# Patient Record
Sex: MALE | Race: WHITE | NOT HISPANIC OR LATINO
[De-identification: names, ages, dates, MRNs, and addresses within clinical notes are randomized per-mention and may not be internally consistent; named-entity substitution may affect disease eponyms.]

---

## 2017-04-24 PROBLEM — Z00.00 ENCOUNTER FOR PREVENTIVE HEALTH EXAMINATION: Status: ACTIVE | Noted: 2017-04-24

## 2017-09-04 ENCOUNTER — TRANSCRIPTION ENCOUNTER (OUTPATIENT)
Age: 57
End: 2017-09-04

## 2023-09-13 ENCOUNTER — NON-APPOINTMENT (OUTPATIENT)
Age: 63
End: 2023-09-13

## 2023-09-13 ENCOUNTER — EMERGENCY (EMERGENCY)
Facility: HOSPITAL | Age: 63
LOS: 1 days | Discharge: HOME | End: 2023-09-13
Attending: STUDENT IN AN ORGANIZED HEALTH CARE EDUCATION/TRAINING PROGRAM
Payer: COMMERCIAL

## 2023-09-13 VITALS
TEMPERATURE: 98 F | DIASTOLIC BLOOD PRESSURE: 92 MMHG | SYSTOLIC BLOOD PRESSURE: 144 MMHG | OXYGEN SATURATION: 96 % | WEIGHT: 175.05 LBS | RESPIRATION RATE: 18 BRPM | HEART RATE: 68 BPM | HEIGHT: 70 IN

## 2023-09-13 VITALS
OXYGEN SATURATION: 98 % | HEART RATE: 54 BPM | DIASTOLIC BLOOD PRESSURE: 85 MMHG | SYSTOLIC BLOOD PRESSURE: 134 MMHG | TEMPERATURE: 98 F | RESPIRATION RATE: 18 BRPM

## 2023-09-13 DIAGNOSIS — H49.22 SIXTH [ABDUCENT] NERVE PALSY, LEFT EYE: ICD-10-CM

## 2023-09-13 DIAGNOSIS — I77.6 ARTERITIS, UNSPECIFIED: ICD-10-CM

## 2023-09-13 PROBLEM — Z00.00 ENCOUNTER FOR PREVENTIVE HEALTH EXAMINATION: Status: ACTIVE | Noted: 2023-09-13

## 2023-09-13 LAB
ALBUMIN SERPL ELPH-MCNC: 3.5 G/DL — SIGNIFICANT CHANGE UP (ref 3.5–5)
ALP SERPL-CCNC: 67 U/L — SIGNIFICANT CHANGE UP (ref 40–120)
ALT FLD-CCNC: 27 U/L DA — SIGNIFICANT CHANGE UP (ref 10–60)
ANION GAP SERPL CALC-SCNC: 0 MMOL/L — LOW (ref 5–17)
APPEARANCE UR: CLEAR — SIGNIFICANT CHANGE UP
AST SERPL-CCNC: 15 U/L — SIGNIFICANT CHANGE UP (ref 10–40)
BASOPHILS # BLD AUTO: 0.05 K/UL — SIGNIFICANT CHANGE UP (ref 0–0.2)
BASOPHILS NFR BLD AUTO: 0.8 % — SIGNIFICANT CHANGE UP (ref 0–2)
BILIRUB SERPL-MCNC: 0.6 MG/DL — SIGNIFICANT CHANGE UP (ref 0.2–1.2)
BILIRUB UR-MCNC: NEGATIVE — SIGNIFICANT CHANGE UP
BUN SERPL-MCNC: 16 MG/DL — SIGNIFICANT CHANGE UP (ref 7–18)
CALCIUM SERPL-MCNC: 8.5 MG/DL — SIGNIFICANT CHANGE UP (ref 8.4–10.5)
CHLORIDE SERPL-SCNC: 109 MMOL/L — HIGH (ref 96–108)
CO2 SERPL-SCNC: 28 MMOL/L — SIGNIFICANT CHANGE UP (ref 22–31)
COLOR SPEC: YELLOW — SIGNIFICANT CHANGE UP
CREAT SERPL-MCNC: 0.93 MG/DL — SIGNIFICANT CHANGE UP (ref 0.5–1.3)
DIFF PNL FLD: NEGATIVE — SIGNIFICANT CHANGE UP
EGFR: 92 ML/MIN/1.73M2 — SIGNIFICANT CHANGE UP
EOSINOPHIL # BLD AUTO: 0.12 K/UL — SIGNIFICANT CHANGE UP (ref 0–0.5)
EOSINOPHIL NFR BLD AUTO: 2 % — SIGNIFICANT CHANGE UP (ref 0–6)
GLUCOSE SERPL-MCNC: 97 MG/DL — SIGNIFICANT CHANGE UP (ref 70–99)
GLUCOSE UR QL: NEGATIVE MG/DL — SIGNIFICANT CHANGE UP
HCT VFR BLD CALC: 42.6 % — SIGNIFICANT CHANGE UP (ref 39–50)
HGB BLD-MCNC: 14.3 G/DL — SIGNIFICANT CHANGE UP (ref 13–17)
IMM GRANULOCYTES NFR BLD AUTO: 0.3 % — SIGNIFICANT CHANGE UP (ref 0–0.9)
KETONES UR-MCNC: NEGATIVE MG/DL — SIGNIFICANT CHANGE UP
LEUKOCYTE ESTERASE UR-ACNC: NEGATIVE — SIGNIFICANT CHANGE UP
LYMPHOCYTES # BLD AUTO: 0.88 K/UL — LOW (ref 1–3.3)
LYMPHOCYTES # BLD AUTO: 14.6 % — SIGNIFICANT CHANGE UP (ref 13–44)
MCHC RBC-ENTMCNC: 30.9 PG — SIGNIFICANT CHANGE UP (ref 27–34)
MCHC RBC-ENTMCNC: 33.6 GM/DL — SIGNIFICANT CHANGE UP (ref 32–36)
MCV RBC AUTO: 92 FL — SIGNIFICANT CHANGE UP (ref 80–100)
MONOCYTES # BLD AUTO: 0.28 K/UL — SIGNIFICANT CHANGE UP (ref 0–0.9)
MONOCYTES NFR BLD AUTO: 4.7 % — SIGNIFICANT CHANGE UP (ref 2–14)
NEUTROPHILS # BLD AUTO: 4.67 K/UL — SIGNIFICANT CHANGE UP (ref 1.8–7.4)
NEUTROPHILS NFR BLD AUTO: 77.6 % — HIGH (ref 43–77)
NITRITE UR-MCNC: NEGATIVE — SIGNIFICANT CHANGE UP
NRBC # BLD: 0 /100 WBCS — SIGNIFICANT CHANGE UP (ref 0–0)
PH UR: 6.5 — SIGNIFICANT CHANGE UP (ref 5–8)
PLATELET # BLD AUTO: 180 K/UL — SIGNIFICANT CHANGE UP (ref 150–400)
POTASSIUM SERPL-MCNC: 4.3 MMOL/L — SIGNIFICANT CHANGE UP (ref 3.5–5.3)
POTASSIUM SERPL-SCNC: 4.3 MMOL/L — SIGNIFICANT CHANGE UP (ref 3.5–5.3)
PROT SERPL-MCNC: 6.6 G/DL — SIGNIFICANT CHANGE UP (ref 6–8.3)
PROT UR-MCNC: NEGATIVE MG/DL — SIGNIFICANT CHANGE UP
RBC # BLD: 4.63 M/UL — SIGNIFICANT CHANGE UP (ref 4.2–5.8)
RBC # FLD: 12.9 % — SIGNIFICANT CHANGE UP (ref 10.3–14.5)
SODIUM SERPL-SCNC: 137 MMOL/L — SIGNIFICANT CHANGE UP (ref 135–145)
SP GR SPEC: 1.02 — SIGNIFICANT CHANGE UP (ref 1–1.03)
TROPONIN I, HIGH SENSITIVITY RESULT: 4.5 NG/L — SIGNIFICANT CHANGE UP
UROBILINOGEN FLD QL: 1 MG/DL — SIGNIFICANT CHANGE UP (ref 0.2–1)
WBC # BLD: 6.02 K/UL — SIGNIFICANT CHANGE UP (ref 3.8–10.5)
WBC # FLD AUTO: 6.02 K/UL — SIGNIFICANT CHANGE UP (ref 3.8–10.5)

## 2023-09-13 PROCEDURE — 70496 CT ANGIOGRAPHY HEAD: CPT | Mod: 26,MA

## 2023-09-13 PROCEDURE — 36415 COLL VENOUS BLD VENIPUNCTURE: CPT

## 2023-09-13 PROCEDURE — 70496 CT ANGIOGRAPHY HEAD: CPT | Mod: MA

## 2023-09-13 PROCEDURE — 85025 COMPLETE CBC W/AUTO DIFF WBC: CPT

## 2023-09-13 PROCEDURE — 96375 TX/PRO/DX INJ NEW DRUG ADDON: CPT | Mod: XU

## 2023-09-13 PROCEDURE — 99285 EMERGENCY DEPT VISIT HI MDM: CPT

## 2023-09-13 PROCEDURE — 80053 COMPREHEN METABOLIC PANEL: CPT

## 2023-09-13 PROCEDURE — 87086 URINE CULTURE/COLONY COUNT: CPT

## 2023-09-13 PROCEDURE — 84484 ASSAY OF TROPONIN QUANT: CPT

## 2023-09-13 PROCEDURE — 70498 CT ANGIOGRAPHY NECK: CPT | Mod: 26,MA

## 2023-09-13 PROCEDURE — 96367 TX/PROPH/DG ADDL SEQ IV INF: CPT

## 2023-09-13 PROCEDURE — 81003 URINALYSIS AUTO W/O SCOPE: CPT

## 2023-09-13 PROCEDURE — 70498 CT ANGIOGRAPHY NECK: CPT | Mod: MA

## 2023-09-13 PROCEDURE — 99283 EMERGENCY DEPT VISIT LOW MDM: CPT

## 2023-09-13 PROCEDURE — 99284 EMERGENCY DEPT VISIT MOD MDM: CPT | Mod: 25

## 2023-09-13 PROCEDURE — 96365 THER/PROPH/DIAG IV INF INIT: CPT | Mod: XU

## 2023-09-13 RX ORDER — DIPHENHYDRAMINE HCL 50 MG
25 CAPSULE ORAL ONCE
Refills: 0 | Status: COMPLETED | OUTPATIENT
Start: 2023-09-13 | End: 2023-09-13

## 2023-09-13 RX ORDER — ACETAMINOPHEN 500 MG
1000 TABLET ORAL ONCE
Refills: 0 | Status: COMPLETED | OUTPATIENT
Start: 2023-09-13 | End: 2023-09-13

## 2023-09-13 RX ORDER — SODIUM CHLORIDE 9 MG/ML
1000 INJECTION INTRAMUSCULAR; INTRAVENOUS; SUBCUTANEOUS ONCE
Refills: 0 | Status: COMPLETED | OUTPATIENT
Start: 2023-09-13 | End: 2023-09-13

## 2023-09-13 RX ORDER — MAGNESIUM SULFATE 500 MG/ML
1 VIAL (ML) INJECTION ONCE
Refills: 0 | Status: COMPLETED | OUTPATIENT
Start: 2023-09-13 | End: 2023-09-13

## 2023-09-13 RX ORDER — METOCLOPRAMIDE HCL 10 MG
10 TABLET ORAL ONCE
Refills: 0 | Status: COMPLETED | OUTPATIENT
Start: 2023-09-13 | End: 2023-09-13

## 2023-09-13 RX ADMIN — Medication 1000 MILLIGRAM(S): at 07:37

## 2023-09-13 RX ADMIN — Medication 10 MILLIGRAM(S): at 06:37

## 2023-09-13 RX ADMIN — SODIUM CHLORIDE 1000 MILLILITER(S): 9 INJECTION INTRAMUSCULAR; INTRAVENOUS; SUBCUTANEOUS at 07:37

## 2023-09-13 RX ADMIN — SODIUM CHLORIDE 1000 MILLILITER(S): 9 INJECTION INTRAMUSCULAR; INTRAVENOUS; SUBCUTANEOUS at 06:37

## 2023-09-13 RX ADMIN — Medication 1 GRAM(S): at 08:51

## 2023-09-13 RX ADMIN — Medication 400 MILLIGRAM(S): at 06:37

## 2023-09-13 RX ADMIN — Medication 200 GRAM(S): at 07:51

## 2023-09-13 RX ADMIN — Medication 25 MILLIGRAM(S): at 07:51

## 2023-09-13 NOTE — ED ADULT TRIAGE NOTE - CHIEF COMPLAINT QUOTE
Pt c/o headache on the left side of his head with blurry vision that started on Sunday, double vision started yesterday morning, was seen at [facility] yesterday for same c/o and was treated for migraine with medication, but symptoms did not go away

## 2023-09-13 NOTE — ED ADULT NURSE NOTE - OBJECTIVE STATEMENT
As per patient c/o headache. Patient reports headache x3 days. patient also reports blurry vision x2 day. Patient states he was treated at urgent care but symptoms have not resolved. No apparent distress noted. Pt denies all other signs and symptoms.

## 2023-09-13 NOTE — ED PROVIDER NOTE - CLINICAL SUMMARY MEDICAL DECISION MAKING FREE TEXT BOX
Patient presenting for headache. noting double vision. no entrapment noted. eye movement intact. will obtain lab, ct, symptomatic treatment and reassess

## 2023-09-13 NOTE — CONSULT NOTE ADULT - SUBJECTIVE AND OBJECTIVE BOX
Patient is a 63y old  Male who presents with a chief complaint of     HPI:  Although he has suffered intermittent headaches in the past, he has not had very severe headaches and cannot remember headache associated with blurred vision and double vision.  He received Tegretol in the emergency room and over the next 2 hours reports improvement in the pain.  At the time of admission his pain was 10 out of 10 and currently here reports 6 out of 10.  The pain is in the parasagittal line extending from the vertex to the front on the left.  The double vision is mostly when looking to the left and disappears by covering 1 eye.PAST MEDICAL & SURGICAL HISTORY:      FAMILY HISTORY:        Social Hx:  Nonsmoker, no drug or alcohol use    MEDICATIONS  (STANDING):       Allergies    No Known Allergies    Intolerances        ROS: Pertinent positives in HPI, all other ROS were reviewed and are negative.      Vital Signs Last 24 Hrs  T(C): 36.6 (13 Sep 2023 11:58), Max: 36.7 (13 Sep 2023 05:36)  T(F): 97.8 (13 Sep 2023 11:58), Max: 98 (13 Sep 2023 05:36)  HR: 54 (13 Sep 2023 11:58) (54 - 68)  BP: 134/85 (13 Sep 2023 11:58) (132/84 - 144/92)  BP(mean): --  RR: 18 (13 Sep 2023 11:58) (17 - 18)  SpO2: 98% (13 Sep 2023 11:58) (96% - 98%)    Parameters below as of 13 Sep 2023 11:58  Patient On (Oxygen Delivery Method): room air            Constitutional: awake and alert.  HEENT: PERRLA, Left abducens weakness  Neck: Supple.  Respiratory: Breath sounds are clear bilaterally  Cardiovascular: S1 and S2, regular rhythm  Gastrointestinal: soft, nontender  Extremities:  no edema  Vascular: Caritid Bruit - no  Musculoskeletal: no joint swelling/tenderness, no abnormal movements  Skin: No rashes    Neurological exam:  HF: A x O x 3. Appropriately interactive, normal affect. Speech fluent, No Aphasia or paraphasic errors. Naming /repetition intact   CN: KRUPA, left abducens weakness on left conjugate lateral gaze test. , VFF, facial sensation normal, no NLFD, tongue midline, Palate moves equally, SCM equal bilaterally  Motor: No pronator drift, Strength 5/5 in all 4 ext, normal bulk and tone, no tremor, rigidity or bradykinesia.    Sens: Intact to light touch / PP/ VS/ JS    Reflexes: Symmetric and normal . BJ 2+, BR 2+, KJ 2+, AJ 2+, downgoing toes b/l  Coord:  No FNFA, dysmetria, RUBIO intact   Gait/Balance: Normal/Cannot test    NIHSS: 2  MRS 1  1A: Level of consciousness       0= Alert; keenly responsive    1B: Ask month and age       0= Both questions right    1C: "Blink eyes" and "Squeeze Hands"       0= Performs both      2: Horizontal EOMs         +2= Forzed gaze palsy: cannot be overcome    3: Visual fields       0= No visual loss        4: Facial palsy (use grimace if obtunded)       0= Normal symmetry        5A: Left arm motor drift (count out loud and use fingers to show count)       0= No drift x 10 seconds       5B: Right arm motor drift       0= No drift x 10 seconds         6A: Left leg motor drift       0= No drift x 10 seconds       +  6B: Right leg motor drift       0= No drift x 10 seconds         7: Limb ataxia (FNF/heel-shin)       0= No ataxia          8: Sensation       0= Normal, no sensory loss         9: Language/aphasia- describe the scene (on sadie); name the items; read the sentences (on sadie)       0= Normal, no aphasia       10: Dysarthria- read the words       0= Normal           11: Extinction/inattention       0= No abnormality                Labs:                        14.3   6.02  )-----------( 180      ( 13 Sep 2023 06:21 )             42.6     09-13    137  |  109<H>  |  16  ----------------------------<  97  4.3   |  28  |  0.93    Ca    8.5      13 Sep 2023 06:21    TPro  6.6  /  Alb  3.5  /  TBili  0.6  /  DBili  x   /  AST  15  /  ALT  27  /  AlkPhos  67  09-13            Radiology report:  - CT head:    < from: CT Angio Neck w/ IV Cont (09.13.23 @ 08:33) >    ACC: [id number] EXAM:  CT ANGIO NECK (W)AW IC   ORDERED BY: ADENIKE ALLRED     ACC: [id number] EXAM:  CT ANGIO BRAIN (W)AW IC   ORDERED BY: ADENIKE ALLRED     PROCEDURE DATE:  09/13/2023          INTERPRETATION:  CLINICAL INDICATION: Headache and double vision for 3   days.    TECHNIQUE: CT of the head was performed with multiplanar reformats,   without IV contrast. CTA of the head and neck was performed after the   intravenous administration of  90 mL Omnipaque 350 nonionic IV contrast.   MIP reconstructions were performed on a separate workstation and reviewed.    COMPARISON: None available.    FINDINGS:  CT HEAD:  No acute transcortical infarction or acute intracranial hemorrhage.  White matter hypoattenuating foci are noted, compatible with small vessel   disease.  No hydrocephalus. No extra-axial fluid collections.  The visualized intraorbital contents are normal. The imaged portions of   the paranasal sinuses are clear. The mastoid air cells are clear. The   visualized soft tissues and osseous structures appear normal.    CTA NECK:  The visualized portion of the aortic arch is unremarkable in appearance.   The origins of the great vessels and the vertebral arteries are normal   without evidence of stenosis.    The common carotid arteries are patent bilaterally without evidence of   stenosis. There is no narrowing of the cervical internal carotid arteries   bilaterally.    The vertebral arteries are patent bilaterally throughout their course.    CTA HEAD:  Normal distal internal carotid arteries.    The proximal anterior, middle and posterior cerebral arteries are patent   bilaterally. Fetal predisposition of the right PCA.    Normal vertebrobasilar system.    No evidence of vascular stenosis, occlusion, aneurysm or vascular   malformation.    OTHER:  1.1 cm nodule in the right lung apex () and 0.7 cm ().    IMPRESSION:  CT head:  -No acute intracranial findings.    CT angiogram head:  -No vaso-occlusive disease.    CT angiogram neck:  -No vaso-occlusivedisease  -2 right lung apex nodules measuring up to 1.1 cm Differential includes   infectious/inflammatory process. Recommend CT chest follow-up in 6-8   weeks to ensure resolution.     _____________  NASCET criteria for internal carotid artery stenosis:  Mild: 0% to 49%  Moderate: 50% to 69%  Severe: 70% to 99%  Complete Occlusion    --- End of Report ---    < end of copied text >

## 2023-09-13 NOTE — CONSULT NOTE ADULT - ASSESSMENT
Intractable migraine with aura with status migrainosus. Recommend continue tegretol 100 mg BID for prevention of cephalalgia and verapamil  mg . PRN sumatriptan. Follow up with Dr Chacon neuroophthalmologist and in the [place] [facility]. Will arrange MRI head w/wo to investigate demyelinating/vasculitis causing CN VI disorder.

## 2023-09-13 NOTE — ED ADULT NURSE NOTE - NSFALLUNIVINTERV_ED_ALL_ED
Bed/Stretcher in lowest position, wheels locked, appropriate side rails in place/Call bell, personal items and telephone in reach/Instruct patient to call for assistance before getting out of bed/chair/stretcher/Non-slip footwear applied when patient is off stretcher/[place] to call system/Physically safe environment - no spills, clutter or unnecessary equipment/Purposeful proactive rounding/Room/bathroom lighting operational, light cord in reach

## 2023-09-13 NOTE — ED PROVIDER NOTE - PROGRESS NOTE DETAILS
patient ct negative. neuro consult. 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 clinically stable. given neuro ophtho f.u info recommended by dr osborne. given return precaution and instructed to fu pmd

## 2023-09-13 NOTE — ED PROVIDER NOTE - CARE PROVIDER_API CALL
Garrett Cohn  Ophthalmology  [address] [place] [address], [facility] [address]  [place] [address]  Phone: [phone]  Fax: [phone]  Follow Up Time:

## 2023-09-13 NOTE — ED ADULT NURSE NOTE - CHIEF COMPLAINT QUOTE
Pt c/o headache on the left side of his head with blurry vision that started on Sunday, double vision started yesterday morning, was seen at [facility] yesterday for same c/o and was treated for migraine with medication, but symptoms did not go away

## 2023-09-13 NOTE — ED PROVIDER NOTE - PATIENT PORTAL LINK FT
You can access the FollowMyHealth Patient Portal offered by [facility] by registering at the following website: [email or web address]. By joining [facility]’s FollowMyHealth portal, you will also be able to view your health information using other applications (apps) compatible with our system.

## 2023-09-13 NOTE — ED PROVIDER NOTE - OBJECTIVE STATEMENT
63 y.o w/ no sig pmh presenting with headache x 4 days associated with double vision. denies n, v, fever, cough, sob. endorses symptoms noted resolved with naproxen

## 2023-09-13 NOTE — ED PROVIDER NOTE - NSFOLLOWUPINSTRUCTIONS_ED_ALL_ED_FT
Diplopia  A normal eye compared to an eye with a cataract.  Diplopia is a condition in which a person sees two of a single object. It is also called double vision. There are two types of diplopia.  Monocular diplopia. This is double vision that is present when only one eye is open. It can occur in one or both eyes. Monocular diplopia is often caused by irregularity in the surface layer of your eye (cornea), a clouding of the lens in your eye (cataract), or a problem in the way your eye focuses light.  Binocular diplopia. This is double vision that is present only when both eyes are open. When you close one eye, the double vision will go away. Binocular diplopia may be more serious. It can be caused by:  Problems with the nerves or muscles that are responsible for eye movement.  Disease of the nerves (neurologic disease).  Immune system conditions, such as Graves' disease.  Migraine headaches.  Tumors.  An infection.  A stroke.  An injury.  There are many causes of diplopia. Some are not dangerous and can be easily corrected. Diplopia may also be a symptom of a serious medical problem. You may need to see a health care provider who specializes in eye conditions (ophthalmologist) or a nerve specialist (neurologist) to find the cause.    Follow these instructions at home:  A car, with a "do not drive" sign.  Pay attention to any changes in your vision. Tell your health care provider about them.  Do not drive or operate machinery if diplopia interferes with your vision.  Keep all follow-up visits. This is important.  Contact a health care provider if:  Your diplopia gets worse.  You develop any other symptoms along with your diplopia, such as:  Weakness.  Numbness.  Headache.  Eye pain.  Clumsiness.  Nausea.  Drooping eyelids.  Abnormal movement of one eye.  Get help right away if:  You have sudden vision loss.  You suddenly get a very bad headache.  You have sudden weakness or numbness.  You develop droopiness on one side of your face.  You suddenly lose the ability to speak, understand speech, or both.  You develop difficulty breathing.  These symptoms may represent a serious problem that is an emergency. Do not wait to see if the symptoms will go away. Get medical help right away. Call your local emergency services (911 in the [place] Do not drive yourself to the hospital.    Summary  Diplopia is a condition in which a person sees two of a single object. It is also called double vision.  Monocular diplopia is double vision that affects only one eye at a time. It is often caused by irregularity in the surface layer of your eye (cornea), a clouding of the lens in your eye (cataract), or a problem in the way your eye focuses light.  Binocular diplopia is double vision that affects both eyes at the same time. However, when you shut one eye, the double vision will go away. Binocular diplopia may be more serious.  If you have diplopia, you may need to see a health care provider who specializes in eye conditions (ophthalmologist) or a nerve specialist (neurologist) to find the cause.  This information is not intended to replace advice given to you by your health care provider. Make sure you discuss any questions you have with your health care provider.

## 2023-09-14 LAB
CULTURE RESULTS: SIGNIFICANT CHANGE UP
SPECIMEN SOURCE: SIGNIFICANT CHANGE UP

## 2023-09-15 DIAGNOSIS — G44.1 VASCULAR HEADACHE, NOT ELSEWHERE CLASSIFIED: ICD-10-CM

## 2023-09-15 RX ORDER — DIVALPROEX SODIUM 500 1/1
500 TABLET, EXTENDED RELEASE ORAL
Qty: 30 | Refills: 0 | Status: ACTIVE | COMMUNITY
Start: 2023-09-15 | End: 1900-01-01

## 2023-09-18 RX ORDER — RIMEGEPANT SULFATE 75 MG/75MG
75 TABLET, ORALLY DISINTEGRATING ORAL DAILY
Qty: 16 | Refills: 3 | Status: ACTIVE | COMMUNITY
Start: 2023-09-15 | End: 1900-01-01

## 2023-09-20 ENCOUNTER — APPOINTMENT (OUTPATIENT)
Dept: OPHTHALMOLOGY | Facility: CLINIC | Age: 63
End: 2023-09-20

## 2023-09-20 ENCOUNTER — APPOINTMENT (OUTPATIENT)
Dept: OPHTHALMOLOGY | Facility: CLINIC | Age: 63
End: 2023-09-20
Payer: COMMERCIAL

## 2023-09-20 ENCOUNTER — NON-APPOINTMENT (OUTPATIENT)
Age: 63
End: 2023-09-20

## 2023-09-20 PROCEDURE — V2718: CPT

## 2023-09-20 PROCEDURE — 99204 OFFICE O/P NEW MOD 45 MIN: CPT

## 2023-09-21 ENCOUNTER — TRANSCRIPTION ENCOUNTER (OUTPATIENT)
Age: 63
End: 2023-09-21

## 2023-09-25 DIAGNOSIS — Z09 ENCOUNTER FOR FOLLOW-UP EXAMINATION AFTER COMPLETED TREATMENT FOR CONDITIONS OTHER THAN MALIGNANT NEOPLASM: ICD-10-CM

## 2023-09-25 DIAGNOSIS — G44.019 EPISODIC CLUSTER HEADACHE, NOT INTRACTABLE: ICD-10-CM

## 2023-09-25 DIAGNOSIS — G44.009 CLUSTER HEADACHE SYNDROME, UNSPECIFIED, NOT INTRACTABLE: ICD-10-CM

## 2023-09-25 DIAGNOSIS — H53.2 DIPLOPIA: ICD-10-CM

## 2023-09-26 ENCOUNTER — APPOINTMENT (OUTPATIENT)
Dept: NEUROLOGY | Facility: CLINIC | Age: 63
End: 2023-09-26
Payer: COMMERCIAL

## 2023-09-26 VITALS
HEART RATE: 73 BPM | HEIGHT: 70 IN | DIASTOLIC BLOOD PRESSURE: 88 MMHG | OXYGEN SATURATION: 99 % | WEIGHT: 170 LBS | SYSTOLIC BLOOD PRESSURE: 144 MMHG | BODY MASS INDEX: 24.34 KG/M2

## 2023-09-26 VITALS — DIASTOLIC BLOOD PRESSURE: 88 MMHG | SYSTOLIC BLOOD PRESSURE: 137 MMHG

## 2023-09-26 DIAGNOSIS — Z83.3 FAMILY HISTORY OF DIABETES MELLITUS: ICD-10-CM

## 2023-09-26 DIAGNOSIS — Z82.49 FAMILY HISTORY OF ISCHEMIC HEART DISEASE AND OTHER DISEASES OF THE CIRCULATORY SYSTEM: ICD-10-CM

## 2023-09-26 PROCEDURE — 99495 TRANSJ CARE MGMT MOD F2F 14D: CPT

## 2023-09-27 ENCOUNTER — TRANSCRIPTION ENCOUNTER (OUTPATIENT)
Age: 63
End: 2023-09-27

## 2023-10-09 ENCOUNTER — TRANSCRIPTION ENCOUNTER (OUTPATIENT)
Age: 63
End: 2023-10-09

## 2023-11-20 ENCOUNTER — APPOINTMENT (OUTPATIENT)
Dept: OPHTHALMOLOGY | Facility: CLINIC | Age: 63
End: 2023-11-20
Payer: COMMERCIAL

## 2023-11-20 ENCOUNTER — NON-APPOINTMENT (OUTPATIENT)
Age: 63
End: 2023-11-20

## 2023-11-20 PROCEDURE — 92060 SENSORIMOTOR EXAMINATION: CPT

## 2023-11-20 PROCEDURE — 92012 INTRM OPH EXAM EST PATIENT: CPT

## 2024-01-22 ENCOUNTER — NON-APPOINTMENT (OUTPATIENT)
Age: 64
End: 2024-01-22

## 2024-01-22 ENCOUNTER — APPOINTMENT (OUTPATIENT)
Dept: OPHTHALMOLOGY | Facility: CLINIC | Age: 64
End: 2024-01-22
Payer: COMMERCIAL

## 2024-01-22 PROCEDURE — 92012 INTRM OPH EXAM EST PATIENT: CPT

## 2024-01-29 ENCOUNTER — APPOINTMENT (OUTPATIENT)
Dept: NEUROLOGY | Facility: CLINIC | Age: 64
End: 2024-01-29
Payer: COMMERCIAL

## 2024-01-29 VITALS
HEIGHT: 70 IN | DIASTOLIC BLOOD PRESSURE: 85 MMHG | WEIGHT: 170 LBS | BODY MASS INDEX: 24.34 KG/M2 | SYSTOLIC BLOOD PRESSURE: 152 MMHG | OXYGEN SATURATION: 98 % | TEMPERATURE: 97.6 F | HEART RATE: 77 BPM

## 2024-01-29 PROCEDURE — 99214 OFFICE O/P EST MOD 30 MIN: CPT

## 2024-01-29 NOTE — HISTORY OF PRESENT ILLNESS
[FreeTextEntry1] : Oct 2023 He does not suffer headaches and has stopped the Depakote. Dr Knutson, the neuro-ophthalmologist reportedly attributed the Left 6th nerve palsy to migraine. He has dont an MRI scan of the brain 1/28/2024: He has not suffered any more headaches.  He did suffer 1 transient episode of double vision that lasted less than a day.  After his last episode of double vision when he had to go to the hospital, he saw neuro-ophthalmology.  The last visit with neuro-ophthalmology he was informed that his left abducens weakness is 98% better he did have a headache at the same time.  He has not had to use Nurtec.  In October last year as recommended he sent a record of his blood pressures taken at home.  The blood pressures showed normal systolic and occasionally higher than normal diastolic with low heart rates in the 50s and 60s.

## 2024-01-29 NOTE — DISCUSSION/SUMMARY
[FreeTextEntry1] : Unknown cause of transient left abducens weakness.  Was called as manifestation of migraine by neuro-ophthalmology reportedly.  Based on the slightly higher diastolic readings that he has recorded and the transient double vision he reported more recently, it is likely that he is developing microvascular ischemic disease.  I recommend a thiazide diuretic and follow-up with his primary care provider.  He will maintain a blood pressure record, use the thiazide diuretic if the diastolic blood pressures remain in the high 80s and 90s range while continuing to record the blood pressure after beginning the thiazide diuretic.  He will take this information to his primary care provider and continue treatment with his PCP as recommended.  He will return for follow-up as needed

## 2024-01-29 NOTE — PHYSICAL EXAM
[Person] : oriented to person [Place] : oriented to place [Time] : oriented to time [Concentration Intact] : normal concentrating ability [Visual Intact] : visual attention was ~T not ~L decreased [Naming Objects] : no difficulty naming common objects [Repeating Phrases] : no difficulty repeating a phrase [Writing A Sentence] : no difficulty writing a sentence [Fluency] : fluency intact [Comprehension] : comprehension intact [Reading] : reading intact [Past History] : adequate knowledge of personal past history [Cranial Nerves Optic (II)] : visual acuity intact bilaterally,  visual fields full to confrontation, pupils equal round and reactive to light [Cranial Nerves Oculomotor (III)] : extraocular motion intact [Cranial Nerves Trigeminal (V)] : facial sensation intact symmetrically [Cranial Nerves Facial (VII)] : face symmetrical [Cranial Nerves Vestibulocochlear (VIII)] : hearing was intact bilaterally [Cranial Nerves Glossopharyngeal (IX)] : tongue and palate midline [Cranial Nerves Accessory (XI - Cranial And Spinal)] : head turning and shoulder shrug symmetric [Cranial Nerves Hypoglossal (XII)] : there was no tongue deviation with protrusion [Motor Tone] : muscle tone was normal in all four extremities [Motor Strength] : muscle strength was normal in all four extremities [No Muscle Atrophy] : normal bulk in all four extremities [Sensation Tactile Decrease] : light touch was intact [Abnormal Walk] : normal gait [Balance] : balance was intact [Past-pointing] : there was no past-pointing [Tremor] : no tremor present [2+] : Ankle jerk left 2+ [Plantar Reflex Right Only] : normal on the right [Plantar Reflex Left Only] : normal on the left [Sclera] : the sclera and conjunctiva were normal [PERRL With Normal Accommodation] : pupils were equal in size, round, reactive to light, with normal accommodation [Extraocular Movements] : extraocular movements were intact [Neck Appearance] : the appearance of the neck was normal [Neck Cervical Mass (___cm)] : no neck mass was observed [Jugular Venous Distention Increased] : there was no jugular-venous distention [Thyroid Diffuse Enlargement] : the thyroid was not enlarged [Thyroid Nodule] : there were no palpable thyroid nodules [Auscultation Breath Sounds / Voice Sounds] : lungs were clear to auscultation bilaterally [Heart Rate And Rhythm] : heart rate was normal and rhythm regular [Heart Sounds] : normal S1 and S2 [Heart Sounds Gallop] : no gallops [Murmurs] : no murmurs [Heart Sounds Pericardial Friction Rub] : no pericardial rub [Bowel Sounds] : normal bowel sounds [Abdomen Soft] : soft [Abdomen Tenderness] : non-tender [] : no hepato-splenomegaly [Abdomen Mass (___ Cm)] : no abdominal mass palpated

## 2024-01-29 NOTE — ASSESSMENT
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  Hypertension